# Patient Record
(demographics unavailable — no encounter records)

---

## 2018-01-01 NOTE — DS
Prenatal Information: 





 Previous Pregnancy/Births











Maternal Age                   34


 


Grav                           4


 


Para                           2


 


SAB                            0


 


IEA                            1


 


LC                             1


 


Maternal Blood Type and Rh     A Positive








 Testing Needs/Results











Gestational Age in Weeks and   38 Weeks and 5 Days





Days                           


 


Determined By                  LMP


 


Violence or Abuse During this  No





Pregnancy                      


 


Feeding Plan                   Breast


 


Planned Infant Care Provider   [facility]





Post-Discharge                 


 


Serology/RPR Result            Non-Reactive


 


Rubella Result                 Immune


 


HBsAg Result                   Negative


 


HIV Result                     Negative


 


GBS Culture Result             Positive











 Significant Medical History











Hx Diabetes                    No


 


Hx Hypertension                No


 


Hx Depression                  Yes


 


Hx Anxiety                     Yes


 


Hx  Section            No


 


Hx Other Reproductive          Yes: IUGR





Disorders/Problems             


 


Other Pertinent Medical        history gastric bypass 10/12





History                        








 Tobacco/Alcohol/Substance Use











Smoking Status (MU)            Former Smoker


 


When Did the Patient Quit      





Smoking/Using Tobacco          


 


Household Exposure             No


 


Household Exposure Type        Cigarettes


 


Alcohol Use                    None


 


Substance Use Type             Prescribed


 


Substance Use Comment - Amount Precoset 1 tab: last 18 at 1700





& Last Used                    








 Delivery Information/Events of Note











Date of Birth [A]              18


 


Time of Birth [A]              12:54


 


Delivery Method [A]            Spontaneous Vaginal


 


Labor [A]                      Spontaneous


 


Did Patient attempt ? [A]  N/A, No Previous C-Sectio


 


Amniotic Fluid [A]             Clear


 


Anesthesia/Analgesia [A]       CEI for Labor


 


Level of Nursery               Regular/Bedside


 


Delivery Events of Note        Pitocin Only After Delive,Full Course of ABX

















Delivery Events


Date of Birth: 18


Time of Birth: 12:54


Apgar Score 1 Minute: 8


Apgar Score 5 Minutes: 9


Gestational Age Weeks: 38


Gestational Age Days: 5


Delivery Type: Vaginal


Amniotic Fluid: Clear


Intrapartal Antibiotics Indicated: Positive GBS Culture this Pregnancy, 

Laboring Patient


ROM Length: ROM < 18 Hours


Antibiotic Treatment: GBS Specific Antibx Given > 2hrs Prior to Delivery (PCN,

AMP,KEFZOL)


Hepatitis B Vaccine: Given Within 12 Hours


Immunoglobulin Given: No


 Drug Withdrawal Risk: Routinely Taking Prescription Narcotics


Hepatitis B Status/Risk: Mother HBsAg NEGATIVE With No New Risk Factors


Maternal Consent: Mother CONSENTS To Infant Hepatitis Vaccine +/- HBIG


Date of Service: 18


Method of Feeding: Bottle


Formula: Enfamil Lipil


Feeding Frequency: Ad Luz


Feeding Description: 





30-60 mL/feed


Feeding Status: Without Difficulty


Stool Passed: Yes


Voiding: Yes





Measurements


Current Weight: 3.32 kg


Weight in lbs and ozs: 7 lbs and 5 oz


Weight Yesterday: 3.35 kg


Weight Gain/Loss Since Last Weight In Grams: 30.0 Loss


Birth Weight: 3.387 kg


Birthweight in lbs and ozs: 7 lbs and 7 oz


% Weight Gain/Loss from Birth Weight: 2% Loss


Length: 19 in


Head Circumference in inches: 14


Abdominal Girth in cm: 32


Abdominal Girth in inches: 12.598





Vitals


Vital Signs: 


 Vital Signs











  18





  12:15 15:44 20:27


 


Temperature 98.7 F 98.7 F 98.3 F


 


Pulse Rate 142 136 122


 


Respiratory 44 40 36





Rate   














  18





  00:15 04:39


 


Temperature 98.3 F 98.7 F


 


Pulse Rate 144 130


 


Respiratory 36 42





Rate  














 Physical Exam


General Appearance: Alert, Active


Skin Color: Normal


Level of Distress: No Distress


Nutritional Status: AGA


Cranial Features: Normal head shape, Normal fontanelles


Eyes: Bilateral Normal, Bilateral Red Reflex


Neck: Normal Tone


Respiratory Effort: Normal


Respiratory Rate: Normal


Auscultation: Bilateral Good Air Exchange


Breath Sounds: NL Both Lungs


Rhythm: Regular


Heart Sounds: Normal: S1, S2


Abnormal Heart Sounds: No Murmurs, No S3, No S4


Femoral Pulses: Bilateral Normal


Umbilicus Assessment: Yes Normal


Abdomen: Normal


Abdomen Palpation: Liver Normal, Spleen Normal


Clavicles: Normal


Left Hip: Normal ROM


Right Hip: Normal ROM


Skin Texture: Smooth, Soft


Skin Appearance: No Abnormalities


Neuro: Normal: [place], Sucking, Muscle Tone





Medications


Home Medications: 


 Home Medications











 Medication  Instructions  Recorded  Confirmed  Type


 


NK [No Home Medications Reported]  18 History











Inpatient Medications: 


 Medications





Dextrose (Glutose Oral Nicu*)  0 ml BUCCAL .SEE MD INSTRUCTIONS PRN; Protocol


   PRN Reason: ASYMTOMATIC HYPOGLYCEMIA











Results/Investigations


Transcutaneous Bilirubin Result: 3.6


Time Obtained: 04:00


Age in Hours: 41


Risk Zone: Low Risk


Major Jaundice Risk Factors: None


Minor Jaundice Risk Factors: 


Decreased Jaundice Risk: Bili in low risk zone, Formula feeding, African-

American


CCHD Screen: Passed


Lab Results: 


 











  18





  12:54 06:00


 


Urine Opiates Screen   None detected


 


Ur Barbiturates Screen   None detected


 


Ur Phencyclidine Scrn   None detected


 


Ur Amphetamines Screen   None detected


 


U Benzodiazepines Scrn   None detected


 


Urine Cocaine Screen   None detected


 


U Cannabinoids Screen   None detected


 


RPR  Nonreactive 














Hospital Course


Hospital Course: 





The patient has remained stable with low DAVE scoring (0-4).  She is feeding 

well and her mother has no concerns (other than that she wants to feed every 2 

hours(


[place] Done





Assessment





- Assessment


Condition at Discharge: Stable


Discharge Disposition: Home


Diagnosis at Discharge: Well term AGA female 





Plan





- Follow Up Care


Follow Up Care Provider: [facility]


In Number of Days: This week


Appointment Status: To Call Office





- Anticipatory Guidance/Instruction


Provided Guidance to: Mother


Guidance and Instruction: feeding schedule/plan, signs of jaundice, safety in 

home, contact physician on call

## 2018-01-01 NOTE — HP
Information from Mother's Record: 





 Previous Pregnancy/Births











Maternal Age                   34


 


Grav                           4


 


Para                           2


 


SAB                            0


 


IEA                            1


 


LC                             1


 


Maternal Blood Type and Rh     A Positive








 Testing Needs/Results











Gestational Age in Weeks and   38 Weeks and 5 Days





Days                           


 


Determined By                  LMP


 


Violence or Abuse During this  No





Pregnancy                      


 


Feeding Plan                   Breast


 


Planned Infant Care Provider   [facility]





Post-Discharge                 


 


Serology/RPR Result            Non-Reactive


 


Rubella Result                 Immune


 


HBsAg Result                   Negative


 


HIV Result                     Negative


 


GBS Culture Result             Positive











 Significant Medical History











Hx Diabetes                    No


 


Hx Hypertension                No


 


Hx Depression                  Yes


 


Hx Anxiety                     Yes


 


Hx  Section            No


 


Hx Other Reproductive          Yes: IUGR





Disorders/Problems             


 


Other Pertinent Medical        history gastric bypass 10/12





History                        








 Tobacco/Alcohol/Substance Use











Smoking Status (MU)            Former Smoker


 


When Did the Patient Quit      





Smoking/Using Tobacco          


 


Household Exposure             No


 


Household Exposure Type        Cigarettes


 


Alcohol Use                    None


 


Substance Use Type             Prescribed


 


Substance Use Comment - Amount Precoset 1 tab: last 18 at 1700





& Last Used                    








 Delivery Information/Events of Note











Date of Birth [A]              18


 


Time of Birth [A]              12:54


 


Delivery Method [A]            Spontaneous Vaginal


 


Labor [A]                      Spontaneous


 


Did Patient attempt ? [A]  N/A, No Previous C-Sectio


 


Amniotic Fluid [A]             Clear


 


Anesthesia/Analgesia [A]       CEI for Labor


 


Level of Nursery               Regular/Bedside


 


Delivery Events of Note        Pitocin Only After Delive,Full Course of ABX

















Prenatal & Delivery History


Prenatal History: 


The patient's mother was on Oxycontin throughout pregnancy for chronic pain 

issues, but weaned off two days prior to delivery and her urine toxicology was 

negative.  The infant's urine toxicology was also negative and her DAVE score 

has been 0 since delivery.





Delivery Events


Date of Birth: 18


Time of Birth: 12:54


Apgar Score 1 Minute: 8


Apgar Score 5 Minutes: 9


Gestational Age Weeks: 38


Gestational Age Days: 5


Delivery Type: Vaginal


Amniotic Fluid: Clear


Intrapartal Antibiotics Indicated: Positive GBS Culture this Pregnancy, 

Laboring Patient


ROM Length: ROM < 18 Hours


Antibiotic Treatment: GBS Specific Antibx Given > 2hrs Prior to Delivery (PCN,

AMP,KEFZOL)


Hepatitis B Vaccine: Given Within 12 Hours


Immunoglobulin Given: No


 Drug Withdrawal Risk: Routinely Taking Prescription Narcotics


Hepatitis B Status/Risk: Mother HBsAg NEGATIVE With No New Risk Factors


Maternal Consent: Mother CONSENTS To Infant Hepatitis Vaccine +/- HBIG





Hypoglycemia Assessment


Hypoglycemia Risk - High: None


Hypoglycemia Symptoms: None





Nutrition and Output





- Nutrition


Method of Feeding: Bottle


Feeding Amount: 30-40 mL/feed


Feeding Frequency: Ad Luz


Nutrition Description: 





Occasionally spitting up small amounts after feeds





- Stool


Stool Passed: Yes





- Voiding


Voiding: Yes





Measurements


Current Weight: 3.35 kg


Weight in lbs and ozs: 7 lbs and 6 oz


Weight Yesterday: 3.387 kg


Weight Gain/Loss Since Last Weight In Grams: 37.0 Loss


Birth Weight: 3.387 kg


Birthweight in lbs and ozs: 7 lbs and 7 oz


% Weight Gain/Loss from Birth Weight: 1% Loss


Length: 19 in


Head Circumference in inches: 14


Abdominal Girth in cm: 32


Abdominal Girth in inches: 12.598





Vitals


Vital Signs: 


 Vital Signs











  18





  13:15 14:00 15:00


 


Temperature 98.4 F 98.9 F 98 F


 


Pulse Rate 136 148 144


 


Respiratory 44 44 44





Rate   














  18





  16:00 17:00 20:53


 


Temperature 98.4 F 98.4 F 98.6 F


 


Pulse Rate 142 144 142


 


Respiratory 44 44 42





Rate   














  18





  01:00 04:45


 


Temperature 98.9 F 98.7 F


 


Pulse Rate 140 138


 


Respiratory 38 38





Rate  














 Physical Exam


General Appearance: Alert, Active


Skin Color: Normal


Level of Distress: No Distress


Nutritional Status: AGA


Cranial Features: Normal head shape, Symmetric facial features, Normal 

fontanelles


Eyes: Bilateral Normal


Ears: Symmetrical, Normal Position, Canals Patent


Oropharynx: Normal: Lips, Mouth, Gums, Uvula


Neck: Normal Tone


Respiratory Effort: Normal


Respiratory Rate: Normal


Chest Appearance: Normal, [place] Breast 3-4 mm Size, Symmetrical


Auscultation: Bilateral Good Air Exchange


Breath Sounds: NL Both Lungs


Location of Apical Pulse: Normal


Rhythm: Regular


Heart Sounds: Normal: S1, S2


Abnormal Heart Sounds: No Murmurs, No S3, No S4


Femoral Pulses: Bilateral Normal


Umbilicus Assessment: Yes Normal


Abdomen: Normal


Abdomen Palpation: Liver Normal, Spleen Normal


Hernia: None


Anus: Patent


Location of Anus: Normal


Genital Appearance: Female


Enlarged Nodes: None


External Genitalia: Normal: Labia, Clitoris, Introitus


Urethral Meatus: Normal


Vagina: Normal for Gestational Age


Clavicles: Normal


Arms: 2 Symmetrical Extremities, Full Range of Motion


Hands: 2 Hands, Symmetrical, 5 Fingers on Each Hand, Full Range of Motion


Left Hip: Normal ROM


Right Hip: Normal ROM


Legs: 2 Symmetrical Extremities, Full Range of Motion


Feet: 2 Feet, Symmetrical, Creases on 2/3 of Soles, Full Range of Motion


Spine: Normal


Skin Texture: Smooth, Soft


Skin Appearance: No Abnormalities


Neuro: Normal: [place], Sucking, Muscle Tone





Medications


Home Medications: 


 Home Medications











 Medication  Instructions  Recorded  Confirmed  Type


 


NK [No Home Medications Reported]  18 History











Inpatient Medications: 


 Medications





Dextrose (Glutose Oral Nicu*)  0 ml BUCCAL .SEE MD INSTRUCTIONS PRN; Protocol


   PRN Reason: ASYMTOMATIC HYPOGLYCEMIA











Results/Investigations


Minor Jaundice Risk Factors: 


Decreased Jaundice Risk: Formula feeding, -American


Lab Results: 


 











  18





  06:00


 


Urine Opiates Screen  None detected


 


Ur Barbiturates Screen  None detected


 


Ur Phencyclidine Scrn  None detected


 


Ur Amphetamines Screen  None detected


 


U Benzodiazepines Scrn  None detected


 


Urine Cocaine Screen  None detected


 


U Cannabinoids Screen  None detected














Assessment





- Status


Status: Full-term, AGA


Condition: Stable


Assessment: 





Patient was born to an fully treated GBS (+) mother who was on oxycontin for 

chronic pain until 2 days prior to delivery.





Plan of Care


[facility] Admission to:  Nursery


Plan of Care: 





Routine care


Continue DAVE scoring


Provided Guidance to: Mother


Comments: 





On discussion with neonatology we would expect to see withdrawal symptoms 

within the first 48 hours, so there is no need to observe beyond that unless 

she is starting to display them.